# Patient Record
Sex: FEMALE | Race: BLACK OR AFRICAN AMERICAN | NOT HISPANIC OR LATINO | Employment: FULL TIME | URBAN - METROPOLITAN AREA
[De-identification: names, ages, dates, MRNs, and addresses within clinical notes are randomized per-mention and may not be internally consistent; named-entity substitution may affect disease eponyms.]

---

## 2019-06-28 PROBLEM — M54.41 ACUTE MIDLINE LOW BACK PAIN WITH RIGHT-SIDED SCIATICA: Status: ACTIVE | Noted: 2019-06-28

## 2019-08-20 PROBLEM — M47.16 LUMBAR SPONDYLOSIS WITH MYELOPATHY: Status: ACTIVE | Noted: 2017-01-06

## 2019-09-13 PROBLEM — M48.062 NEUROGENIC CLAUDICATION DUE TO LUMBAR SPINAL STENOSIS: Status: ACTIVE | Noted: 2019-09-13

## 2019-12-02 PROBLEM — K43.9 VENTRAL HERNIA WITHOUT OBSTRUCTION OR GANGRENE: Status: ACTIVE | Noted: 2019-12-02

## 2019-12-05 PROBLEM — Z87.42 HISTORY OF POSTMENOPAUSAL BLEEDING: Status: ACTIVE | Noted: 2019-12-05

## 2019-12-05 PROBLEM — N87.0 DYSPLASIA OF CERVIX, LOW GRADE (CIN 1): Status: ACTIVE | Noted: 2019-12-05

## 2019-12-06 PROBLEM — G89.29 CHRONIC LOW BACK PAIN: Status: ACTIVE | Noted: 2019-12-06

## 2019-12-06 PROBLEM — M54.50 CHRONIC LOW BACK PAIN: Status: ACTIVE | Noted: 2019-12-06

## 2019-12-18 PROBLEM — Z98.890 S/P CONIZATION OF CERVIX: Status: ACTIVE | Noted: 2018-06-18

## 2019-12-18 PROBLEM — N95.0 PMB (POSTMENOPAUSAL BLEEDING): Status: ACTIVE | Noted: 2018-03-27

## 2019-12-18 PROBLEM — K02.9 DENTAL CARIES EXTENDING INTO PULP: Status: ACTIVE | Noted: 2019-12-18

## 2019-12-18 PROBLEM — B02.23 SHINGLES (HERPES ZOSTER) POLYNEUROPATHY: Status: ACTIVE | Noted: 2019-12-18

## 2019-12-18 PROBLEM — Z98.890 S/P D&C (STATUS POST DILATION AND CURETTAGE): Status: ACTIVE | Noted: 2018-06-18

## 2019-12-18 PROBLEM — Z80.3 FAMILY HISTORY OF BREAST CANCER IN FIRST DEGREE RELATIVE: Status: ACTIVE | Noted: 2018-06-19

## 2019-12-18 PROBLEM — K08.9 DISORDER OF TEETH AND SUPPORTING STRUCTURES: Status: ACTIVE | Noted: 2019-12-18

## 2019-12-18 PROBLEM — Z01.20 ENCOUNTER FOR DENTAL EXAMINATION: Status: ACTIVE | Noted: 2019-12-18

## 2020-04-10 ENCOUNTER — NURSE TRIAGE (OUTPATIENT)
Dept: ADMINISTRATIVE | Facility: CLINIC | Age: 60
End: 2020-04-10

## 2020-04-10 NOTE — TELEPHONE ENCOUNTER
Reason for Disposition   [1] Mild body aches, chills, diarrhea, headache, runny nose, or sore throat AND [2] within 14 days of COVID-Exposure    Additional Information   Negative: SEVERE difficulty breathing (e.g., struggling for each breath, speak in single words, bluish lips)   Negative: Sounds like a life-threatening emergency to the triager   Negative: [1] Adult has symptoms of COVID-19 (fever, cough, or SOB) AND [2] lab test positive   Negative: [1] Adult has symptoms of COVID-19 (fever, cough or SOB) AND [2] major community spread where patient lives AND [3] testing not being done for mild symptoms   Negative: [1] Difficulty breathing (shortness of breath) occurs AND [2] onset > 14 days after COVID-19 EXPOSURE (Close Contact) AND [3] no major community spread   Negative: [1] Dry cough occurs AND [2] onset > 14 days after COVID-19 EXPOSURE AND [3] no major community spread   Negative: [1] Wet cough (i.e., white-yellow, yellow, green, or morteza colored sputum) AND [2] onset > 14 days after COVID-19 EXPOSURE AND [3] no major community spread   Negative: [1] Common cold symptoms AND [2] onset > 14 days after COVID-19 EXPOSURE AND [3] no major community spread   Negative: Patient sounds very sick or weak to the triager   Negative: [1] Fever (or feeling feverish) OR cough AND [2] within 14 Days of COVID-19 EXPOSURE (Close Contact)   Negative: [1] Fever (or feeling feverish) OR cough occurs AND [2] within 14 days of travel from another country (international travel)   Negative: [1] Fever (or feeling feverish) OR cough occurs AND [2] within 14 days of travel from a city or area with major community spread   Negative: [1] Fever (or feeling feverish) OR cough occurs AND [2] living in area with major community spread AND [3] testing being done for symptoms   Negative: [1] COVID-19 EXPOSURE (Close Contact) within last 14 days AND [2] NO cough, fever, or breathing difficulty   Negative: [1] COVID-19  EXPOSURE within last 14 days AND [2] NO cough, fever, or breathing difficulty AND [3] exposed person is a healthcare worker who was NOT using all recommended personal protective equipment (i.e., a respirator-N95 mask, eye protection, gloves, and gown)    Protocols used: CORONAVIRUS (COVID-19) EXPOSURE-A-AH

## 2020-04-22 PROBLEM — B02.23 SHINGLES (HERPES ZOSTER) POLYNEUROPATHY: Status: RESOLVED | Noted: 2019-12-18 | Resolved: 2020-04-22

## 2020-04-22 PROBLEM — N87.1 CIN II (CERVICAL INTRAEPITHELIAL NEOPLASIA II): Status: ACTIVE | Noted: 2018-06-12

## 2020-04-22 PROBLEM — K43.9 HERNIA OF ANTERIOR ABDOMINAL WALL: Status: ACTIVE | Noted: 2019-07-08

## 2021-03-25 PROBLEM — N95.2 VAGINAL ATROPHY: Status: ACTIVE | Noted: 2021-03-25

## 2021-11-17 PROBLEM — Z01.20 ENCOUNTER FOR DENTAL EXAMINATION: Status: RESOLVED | Noted: 2019-12-18 | Resolved: 2021-11-17

## 2022-02-25 PROBLEM — R73.03 PREDIABETES: Status: ACTIVE | Noted: 2022-02-25

## 2022-02-25 PROBLEM — K21.9 GASTROESOPHAGEAL REFLUX DISEASE WITHOUT ESOPHAGITIS: Status: ACTIVE | Noted: 2022-02-25

## 2022-02-25 PROBLEM — E78.49 OTHER HYPERLIPIDEMIA: Status: ACTIVE | Noted: 2022-02-25

## 2022-02-25 PROBLEM — G43.919 INTRACTABLE MIGRAINE WITHOUT STATUS MIGRAINOSUS: Status: ACTIVE | Noted: 2022-02-25

## 2023-01-29 PROBLEM — E78.5 HYPERLIPIDEMIA: Status: ACTIVE | Noted: 2022-02-25

## 2023-01-29 PROBLEM — Z86.69 HISTORY OF MIGRAINE: Status: ACTIVE | Noted: 2023-01-29

## 2023-01-29 PROBLEM — I63.9 CVA (CEREBRAL VASCULAR ACCIDENT): Status: ACTIVE | Noted: 2023-01-29

## 2023-01-29 PROBLEM — I65.02 STENOSIS OF LEFT VERTEBRAL ARTERY: Status: ACTIVE | Noted: 2023-01-29

## 2023-01-29 PROBLEM — R07.9 CHEST PAIN: Status: ACTIVE | Noted: 2023-01-29

## 2023-01-29 PROBLEM — J45.909 ASTHMA: Status: ACTIVE | Noted: 2023-01-29

## 2023-01-29 PROBLEM — E04.1 THYROID NODULE: Status: ACTIVE | Noted: 2023-01-29

## 2023-01-30 PROBLEM — R20.2 PARESTHESIA: Status: ACTIVE | Noted: 2023-01-30

## 2023-01-30 PROBLEM — G81.90 HEMIPARESIS: Status: ACTIVE | Noted: 2023-01-30

## 2023-01-30 PROBLEM — E66.01 MORBID OBESITY WITH BODY MASS INDEX (BMI) OF 50.0 TO 59.9 IN ADULT: Status: ACTIVE | Noted: 2023-01-30

## 2023-01-30 PROBLEM — Z79.02 ENCOUNTER FOR MONITORING THROMBOLYTIC THERAPY: Status: ACTIVE | Noted: 2019-12-18

## 2023-01-30 PROBLEM — Z51.81 ENCOUNTER FOR MONITORING THROMBOLYTIC THERAPY: Status: ACTIVE | Noted: 2019-12-18

## 2023-01-30 PROBLEM — E87.6 HYPOKALEMIA: Status: ACTIVE | Noted: 2023-01-30

## 2023-02-24 PROBLEM — Z86.73 HISTORY OF STROKE: Status: ACTIVE | Noted: 2023-02-24

## 2023-02-24 PROBLEM — I63.9 CVA (CEREBRAL VASCULAR ACCIDENT): Status: RESOLVED | Noted: 2023-01-29 | Resolved: 2023-02-24

## 2023-03-06 PROBLEM — R76.0 ANTIPHOSPHOLIPID ANTIBODY POSITIVE: Status: ACTIVE | Noted: 2023-03-06

## 2024-01-31 PROBLEM — M17.11 PRIMARY OSTEOARTHRITIS OF RIGHT KNEE: Status: ACTIVE | Noted: 2024-01-31

## 2024-02-17 PROBLEM — R94.4 DECREASED GFR: Status: ACTIVE | Noted: 2024-02-17

## 2024-02-17 PROBLEM — G43.109 MIGRAINE WITH AURA AND WITHOUT STATUS MIGRAINOSUS, NOT INTRACTABLE: Status: ACTIVE | Noted: 2024-02-17

## 2024-04-17 DIAGNOSIS — U07.1 COVID-19 VIRUS DETECTED: ICD-10-CM

## 2024-04-20 ENCOUNTER — NURSE TRIAGE (OUTPATIENT)
Dept: ADMINISTRATIVE | Facility: CLINIC | Age: 64
End: 2024-04-20

## 2024-04-20 NOTE — TELEPHONE ENCOUNTER
[facility] pt  CV+ on 4/17 in ER.     Cough, SOB, & CP not getting better. Cough getting worse. CP mainly with coughing, but not always, denies CP now. +SOB now at rest.     Dispo-ER now for eval. Pt vu.     Reason for Disposition   MODERATE difficulty breathing (e.g., speaks in phrases, SOB even at rest, pulse 100-120)    Additional Information   Negative: SEVERE difficulty breathing (e.g., struggling for each breath, speaks in single words)   Negative: Difficult to awaken or acting confused (e.g., disoriented, slurred speech)   Negative: Bluish (or gray) lips or face now   Negative: Shock suspected (e.g., cold/pale/clammy skin, too weak to stand, low BP, rapid pulse)   Negative: Sounds like a life-threatening emergency to the triager   Negative: SEVERE or constant chest pain or pressure  (Exception: Mild central chest pain, present only when coughing.)    Protocols used: Coronavirus (COVID-19) Diagnosed or [id number]